# Patient Record
Sex: MALE | Race: WHITE | Employment: FULL TIME | URBAN - METROPOLITAN AREA
[De-identification: names, ages, dates, MRNs, and addresses within clinical notes are randomized per-mention and may not be internally consistent; named-entity substitution may affect disease eponyms.]

---

## 2021-12-07 ENCOUNTER — HOSPITAL ENCOUNTER (OUTPATIENT)
Age: 66
Setting detail: OUTPATIENT SURGERY
Discharge: HOME | End: 2021-12-07
Attending: INTERNAL MEDICINE | Admitting: INTERNAL MEDICINE
Payer: COMMERCIAL

## 2021-12-07 ENCOUNTER — ANESTHESIA EVENT (OUTPATIENT)
Dept: ENDOSCOPY | Age: 66
End: 2021-12-07
Payer: COMMERCIAL

## 2021-12-07 ENCOUNTER — ANESTHESIA (OUTPATIENT)
Dept: ENDOSCOPY | Age: 66
End: 2021-12-07
Payer: COMMERCIAL

## 2021-12-07 VITALS
RESPIRATION RATE: 18 BRPM | BODY MASS INDEX: 25.76 KG/M2 | WEIGHT: 170 LBS | HEART RATE: 61 BPM | DIASTOLIC BLOOD PRESSURE: 69 MMHG | HEIGHT: 68 IN | OXYGEN SATURATION: 97 % | TEMPERATURE: 97.3 F | SYSTOLIC BLOOD PRESSURE: 124 MMHG

## 2021-12-07 VITALS — DIASTOLIC BLOOD PRESSURE: 60 MMHG | OXYGEN SATURATION: 98 % | SYSTOLIC BLOOD PRESSURE: 113 MMHG

## 2021-12-07 PROCEDURE — 88305 TISSUE EXAM BY PATHOLOGIST: CPT

## 2021-12-07 PROCEDURE — 88342 IMHCHEM/IMCYTCHM 1ST ANTB: CPT

## 2021-12-07 PROCEDURE — 3609012400 HC EGD TRANSORAL BIOPSY SINGLE/MULTIPLE: Performed by: INTERNAL MEDICINE

## 2021-12-07 PROCEDURE — 7100000010 HC PHASE II RECOVERY - FIRST 15 MIN: Performed by: INTERNAL MEDICINE

## 2021-12-07 PROCEDURE — 2709999900 HC NON-CHARGEABLE SUPPLY: Performed by: INTERNAL MEDICINE

## 2021-12-07 PROCEDURE — 3609018500 HC EGD US SCOPE W/ADJACENT STRUCTURES: Performed by: INTERNAL MEDICINE

## 2021-12-07 PROCEDURE — 3700000001 HC ADD 15 MINUTES (ANESTHESIA): Performed by: INTERNAL MEDICINE

## 2021-12-07 PROCEDURE — 2500000003 HC RX 250 WO HCPCS: Performed by: NURSE ANESTHETIST, CERTIFIED REGISTERED

## 2021-12-07 PROCEDURE — 7100000011 HC PHASE II RECOVERY - ADDTL 15 MIN: Performed by: INTERNAL MEDICINE

## 2021-12-07 PROCEDURE — 6360000002 HC RX W HCPCS: Performed by: NURSE ANESTHETIST, CERTIFIED REGISTERED

## 2021-12-07 PROCEDURE — 2580000003 HC RX 258: Performed by: INTERNAL MEDICINE

## 2021-12-07 PROCEDURE — 3700000000 HC ANESTHESIA ATTENDED CARE: Performed by: INTERNAL MEDICINE

## 2021-12-07 RX ORDER — PROPOFOL 10 MG/ML
INJECTION, EMULSION INTRAVENOUS PRN
Status: DISCONTINUED | OUTPATIENT
Start: 2021-12-07 | End: 2021-12-07 | Stop reason: SDUPTHER

## 2021-12-07 RX ORDER — SODIUM CHLORIDE, SODIUM LACTATE, POTASSIUM CHLORIDE, CALCIUM CHLORIDE 600; 310; 30; 20 MG/100ML; MG/100ML; MG/100ML; MG/100ML
INJECTION, SOLUTION INTRAVENOUS CONTINUOUS
Status: DISCONTINUED | OUTPATIENT
Start: 2021-12-07 | End: 2021-12-07 | Stop reason: HOSPADM

## 2021-12-07 RX ORDER — FAMOTIDINE 40 MG/1
40 TABLET, FILM COATED ORAL DAILY
COMMUNITY

## 2021-12-07 RX ORDER — CETIRIZINE HYDROCHLORIDE 10 MG/1
10 TABLET ORAL DAILY
COMMUNITY

## 2021-12-07 RX ORDER — LIDOCAINE HYDROCHLORIDE 20 MG/ML
INJECTION, SOLUTION EPIDURAL; INFILTRATION; INTRACAUDAL; PERINEURAL PRN
Status: DISCONTINUED | OUTPATIENT
Start: 2021-12-07 | End: 2021-12-07 | Stop reason: SDUPTHER

## 2021-12-07 RX ADMIN — SODIUM CHLORIDE, POTASSIUM CHLORIDE, SODIUM LACTATE AND CALCIUM CHLORIDE: 600; 310; 30; 20 INJECTION, SOLUTION INTRAVENOUS at 10:15

## 2021-12-07 RX ADMIN — PROPOFOL 100 MG: 10 INJECTION, EMULSION INTRAVENOUS at 12:09

## 2021-12-07 RX ADMIN — PROPOFOL 100 MG: 10 INJECTION, EMULSION INTRAVENOUS at 12:03

## 2021-12-07 RX ADMIN — LIDOCAINE HYDROCHLORIDE 100 MG: 20 INJECTION, SOLUTION EPIDURAL; INFILTRATION; INTRACAUDAL; PERINEURAL at 11:58

## 2021-12-07 RX ADMIN — PROPOFOL 100 MG: 10 INJECTION, EMULSION INTRAVENOUS at 11:58

## 2021-12-07 ASSESSMENT — PULMONARY FUNCTION TESTS
PIF_VALUE: 1

## 2021-12-07 ASSESSMENT — LIFESTYLE VARIABLES: SMOKING_STATUS: 0

## 2021-12-07 ASSESSMENT — PAIN SCALES - GENERAL: PAINLEVEL_OUTOF10: 0

## 2021-12-07 NOTE — ANESTHESIA PRE PROCEDURE
Department of Anesthesiology  Preprocedure Note       Name:  Latha Mathias   Age:  74 y.o.  :  1955                                          MRN:  [id number]         Date:  2021      Surgeon: Nehemias Query):  Benedicto Garay MD    Procedure: Procedure(s):  EGD ESOPHAGOGASTRODUODENOSCOPY ULTRASOUND    Medications prior to admission:   Prior to Admission medications    Medication Sig Start Date End Date Taking? Authorizing Provider   cetirizine (ZYRTEC) 10 MG tablet Take 10 mg by mouth daily   Yes Historical Provider, MD   famotidine (PEPCID) 40 MG tablet Take 40 mg by mouth daily   Yes Historical Provider, MD       Current medications:    No current facility-administered medications for this encounter. Allergies: Allergies   Allergen Reactions    Tetracyclines & Related Hives     \"Skin boils\"       Problem List:  There is no problem list on file for this patient.       Past Medical History:        Diagnosis Date    GERD (gastroesophageal reflux disease)        Past Surgical History:        Procedure Laterality Date    CIRCUMCISION, NON-      CYST REMOVAL      HEMORRHOID SURGERY         Social History:    Social History     Tobacco Use    Smoking status: Former Smoker     Packs/day: 0.50     Years: 15.00     Pack years: 7.50     Types: Cigarettes    Smokeless tobacco: Never Used   Substance Use Topics    Alcohol use: Yes     Comment: a couple of times per week                                Counseling given: Not Answered      Vital Signs (Current):   Vitals:    21 0949   BP: (!) 158/92   Pulse: 59   Resp: 15   Temp: 97.3 °F (36.3 °C)   TempSrc: Temporal   SpO2: 98%   Weight: 170 lb (77.1 kg)   Height: 5' 8\" (1.727 m)                                              BP Readings from Last 3 Encounters:   21 (!) 158/92       NPO Status: Time of last liquid consumption:                         Time of last solid consumption:                         Date of last liquid consumption: 12/06/21                        Date of last solid food consumption: 12/06/21    BMI:   Wt Readings from Last 3 Encounters:   12/07/21 170 lb (77.1 kg)     Body mass index is 25.85 kg/m². CBC: No results found for: WBC, RBC, HGB, HCT, MCV, RDW, PLT    CMP: No results found for: NA, K, CL, CO2, BUN, CREATININE, GFRAA, AGRATIO, LABGLOM, GLUCOSE, PROT, CALCIUM, BILITOT, ALKPHOS, AST, ALT    POC Tests: No results for input(s): POCGLU, POCNA, POCK, POCCL, POCBUN, POCHEMO, POCHCT in the last 72 hours. Coags: No results found for: PROTIME, INR, APTT    HCG (If Applicable): No results found for: PREGTESTUR, PREGSERUM, HCG, HCGQUANT     ABGs: No results found for: PHART, PO2ART, WOF8IDF, SNA7HRL, BEART, P1PWDDPF     Type & Screen (If Applicable):  No results found for: LABABO, LABRH    Drug/Infectious Status (If Applicable):  No results found for: HIV, HEPCAB    COVID-19 Screening (If Applicable): No results found for: COVID19        Anesthesia Evaluation    Airway: Mallampati: II  TM distance: >3 FB   Neck ROM: full  Mouth opening: > = 3 FB Dental: normal exam         Pulmonary: breath sounds clear to auscultation      (-) COPD, asthma, sleep apnea and not a current smoker                           Cardiovascular:        (-) hypertension, past MI, CAD, CABG/stent, dysrhythmias,  angina,  CHF and no hyperlipidemia      Rhythm: regular  Rate: normal           Beta Blocker:  Not on Beta Blocker         Neuro/Psych:      (-) seizures, TIA and CVA           GI/Hepatic/Renal:   (+) GERD:,      (-) hepatitis, liver disease and no morbid obesity       Endo/Other:    (+) no malignancy/cancer. (-) diabetes mellitus, hypothyroidism, hyperthyroidism, no malignancy/cancer               Abdominal:             Vascular:     - DVT and PE. Other Findings:             Anesthesia Plan      MAC     ASA 2       Induction: intravenous. Anesthetic plan and risks discussed with patient.       Plan discussed with CRNA.                   Yasmin Munoz MD   12/7/2021

## 2021-12-07 NOTE — H&P
Pre-operative History and Physical    Patient: Toribio Holcomb  : 1955      History Obtained From:  patient, electronic medical record    HISTORY OF PRESENT ILLNESS:    The patient is a 74 y.o. male here for Endoscopic ultrasound for gastric nodule. Diagnosis Date    GERD (gastroesophageal reflux disease)      Past Surgical History:        Procedure Laterality Date    CIRCUMCISION, NON-      CYST REMOVAL      HEMORRHOID SURGERY       Medications Prior to Admission:   No current facility-administered medications on file prior to encounter. Current Outpatient Medications on File Prior to Encounter   Medication Sig Dispense Refill    cetirizine (ZYRTEC) 10 MG tablet Take 10 mg by mouth daily      famotidine (PEPCID) 40 MG tablet Take 40 mg by mouth daily       Allergies:  Tetracyclines & related    History of allergic reaction to anesthesia:  No    Social History:   TOBACCO:   reports that he has quit smoking. His smoking use included cigarettes. He has a 7.50 pack-year smoking history. He has never used smokeless tobacco.  ETOH:   reports current alcohol use. DRUGS:   reports no history of drug use. Family History:   No family history on file. PHYSICAL EXAM:      BP (!) 158/92   Pulse 59   Temp 97.3 °F (36.3 °C) (Temporal)   Resp 15   Ht 5' 8\" (1.727 m)   Wt 170 lb (77.1 kg)   SpO2 98%   BMI 25.85 kg/m²  I        Heart:  Normal apical impulse, regular rate and rhythm, normal S1 and S2, no S3 or S4, and no murmur noted    Lungs:  No increased work of breathing, good air exchange, clear to auscultation bilaterally, no crackles or wheezing    Abdomen:  No scars, normal bowel sounds, soft, non-distended, non-tender, no masses palpated, no hepatosplenomegally      ASA Grade:  ASA 2 - Patient with mild systemic disease with no functional limitations  Mallampati: I    ASSESSMENT AND PLAN:    1. Patient is a 74 y.o. male here for EUS with deep sedation  2.   Procedure options, risks and benefits reviewed including but not limited to infection, bleeding, perforation, death, and missed lesions. Specifically discussed with FNA increased risk of bleeding, perforation, infection, and pancreatitis with patient. Patient expresses understanding.

## 2021-12-07 NOTE — PROCEDURES
ENDOSCOPIC ULTRASOUND (EUS) REPORT    Patient:  Betty Do    Referring Physician:  Lisa Turk     Indication:  Gastric nodule     Medications:  MAC      Pre-Anesthesia Assessment:  I have reviewed and am in agreement with patient history and medication, including previous response to sedation in the nursing record. A full history and physical and physical was performed. I discussed the risks and benefits of the procedure with the patient including but not limited to infection, bleeding, perforation, medication reaction, and death. In addition I discussed that fine needle aspiration may result in an increased risk of infection, bleeding, perforation, and pancreatitis. The patient elected to have the procedure performed and informed consent was obtained. The patient was brought to the room, a time out was performed and the patient and staff were in agreement that it was the correct patient and procedure. Mallampatti: I  ASA Grade Assessment:2    The plan was to use MAC anesthesia. Vital signs and heart rhythm were monitored prior to and throughout the entire procedure. The patient was reassessed and then adequate sedation was then provided in stepwise fashion. The heart rate, respiratory rate, oxygen saturations, blood pressure, adequacy of pulmonary ventilation, and response to care were monitored throughout the procedure. The physical status of the patient was re-assessed after the procedure. The gastroscope and Olympus radial echoendoscope were introduced through the mouth, and advanced to the second part of duodenum. The upper EUS was accomplished without difficulty. The patient tolerated the procedure well. An endosonoscope with 7. 5MHz processor and color doppler were used throughout the procedure. I performed real time sonographic image interpretation without the assistance of a radiologist.     There were no immediate apparent complications.   EBL: none    Endoscopic Findings:  Duodenum- normal; normal major papilla  Stomach- normal mucosa. Nodule at 9 o'clock in the antrum/pre-pyloric region; retroflexed views normal  Esophagus-EGJ at 40cms. Non-erosive esophagitis present. Findings:  Celiac: Normal  Lymph nodes:no upper abdominal lymphadenopathy  Limited imaging of the left lobe of the liver was normal  The left adrenal gland was normal.  The pancreatic parenchyma was mildly lobular. No masses or lesions on limited views. The PD measured 2.2mm in the HOP, 1.2mm in the BOP, and <1mm in the TOP. The biliary tree evaluation showed a CBD diameter of 2.6mm. no stones or sludge  The gastric nodule is heterogeneous and in the submucosal layer. This is doppler negative. There is intact MP deep to the lesion and the superficial layers are normal. This measures 6.7 x 4.4mm. The gastroscope was readvanced and deep well biopsies obtained.      Impression:  Likely pancreatic rest    Plan:  Call for path in 7 days  Stable for 5 years so would not repeat      Juan Montalvo MD, MD 12/7/2021

## 2021-12-07 NOTE — ANESTHESIA POSTPROCEDURE EVALUATION
Department of Anesthesiology  Postprocedure Note    Patient: Grzegorz Zapata  MRN: [id number]  YOB: 1955  Date of evaluation: 12/7/2021  Time:  12:57 PM     Procedure Summary     Date: 12/07/21 Room / Location: [facility]    Anesthesia Start: 1152 Anesthesia Stop: 1229    Procedures:       EGD ESOPHAGOGASTRODUODENOSCOPY ULTRASOUND (N/A )      EGD BIOPSY (N/A ) Diagnosis: (gastric nodule)    Surgeons: Jace Garcia MD Responsible Provider: Luis Gibbs MD    Anesthesia Type: MAC ASA Status: 2          Anesthesia Type: MAC    Ken Phase I: Ken Score: 10    Ken Phase II: Ken Score: 10    Last vitals: Reviewed and per EMR flowsheets.        Anesthesia Post Evaluation    Patient location during evaluation: bedside  Level of consciousness: awake and alert  Airway patency: patent  Nausea & Vomiting: no vomiting  Complications: no  Cardiovascular status: blood pressure returned to baseline  Respiratory status: acceptable  Hydration status: euvolemic

## (undated) DEVICE — CANNULA SAMP CO2 AD GRN 7FT CO2 AND 7FT O2 TBNG UNIV CONN

## (undated) DEVICE — FORCEPS BX L240CM JAW DIA2.8MM L CAP W/ NDL MIC MESH TOOTH